# Patient Record
Sex: MALE | Race: WHITE | URBAN - METROPOLITAN AREA
[De-identification: names, ages, dates, MRNs, and addresses within clinical notes are randomized per-mention and may not be internally consistent; named-entity substitution may affect disease eponyms.]

---

## 2018-03-22 ENCOUNTER — EMERGENCY (EMERGENCY)
Facility: HOSPITAL | Age: 28
LOS: 0 days | Discharge: HOME | End: 2018-03-23
Attending: EMERGENCY MEDICINE

## 2018-03-22 VITALS
RESPIRATION RATE: 18 BRPM | OXYGEN SATURATION: 100 % | DIASTOLIC BLOOD PRESSURE: 103 MMHG | HEIGHT: 71 IN | SYSTOLIC BLOOD PRESSURE: 162 MMHG | WEIGHT: 179.9 LBS | HEART RATE: 124 BPM | TEMPERATURE: 99 F

## 2018-03-22 DIAGNOSIS — T40.2X1A POISONING BY OTHER OPIOIDS, ACCIDENTAL (UNINTENTIONAL), INITIAL ENCOUNTER: ICD-10-CM

## 2018-03-22 NOTE — ED PROVIDER NOTE - PHYSICAL EXAMINATION
Vital Signs: I have reviewed the initial vital signs.  Constitutional: well-nourished, appears stated age, no acute distress  Cardiovascular: tachycardia, well-perfused extremities  Respiratory: unlabored respiratory effort, clear to auscultation bilaterally  Gastrointestinal: soft, non-tender abdomen, no pulsatile mass  Musculoskeletal: supple neck, no lower extremity edema  Integumentary: warm, dry, no rash  Neurologic: awake, alert, cranial nerves II-XII grossly intact, extremities’ motor and sensory functions grossly intact  Psychiatric: appropriate mood, appropriate affect

## 2018-03-22 NOTE — ED PROVIDER NOTE - CARE PLAN
Principal Discharge DX:	Opioid overdose, accidental or unintentional, initial encounter  Secondary Diagnosis:	History of opioid abuse

## 2018-03-22 NOTE — ED PROVIDER NOTE - PROGRESS NOTE DETAILS
Pt continues to deny any symptoms.  Doesn't wish for further intervention.  Detectives in ED, speaking with patient.  No evidence of long-acting opioid on board since Naloxone half-time surpassed and patient demonstrates no evidence of opioid toxicity.  Will dc.

## 2018-03-22 NOTE — ED PROVIDER NOTE - OBJECTIVE STATEMENT
Pt is a 26yo male who comes in after being at a "friend's" house and being found with respiratory depression, responding to 2mg IN and 2mg IV Narcan.  Pt denies any complaints now - states he just wants to leave.  Doesn't want anything done.  He is upset because he has dreams of being in [place] and .  He reports he drank and sniffed unknown substance.

## 2018-03-23 VITALS — HEART RATE: 97 BPM
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
## 2021-11-11 ENCOUNTER — EMERGENCY (EMERGENCY)
Facility: HOSPITAL | Age: 31
LOS: 0 days | Discharge: HOME | End: 2021-11-12
Attending: EMERGENCY MEDICINE | Admitting: EMERGENCY MEDICINE
Payer: COMMERCIAL

## 2021-11-11 VITALS
SYSTOLIC BLOOD PRESSURE: 139 MMHG | WEIGHT: 190.04 LBS | OXYGEN SATURATION: 99 % | HEIGHT: 71 IN | HEART RATE: 83 BPM | DIASTOLIC BLOOD PRESSURE: 63 MMHG | TEMPERATURE: 98 F | RESPIRATION RATE: 18 BRPM
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
## 2021-11-11 DIAGNOSIS — M54.59 OTHER LOW BACK PAIN: ICD-10-CM

## 2021-11-11 DIAGNOSIS — M54.9 DORSALGIA, UNSPECIFIED: ICD-10-CM

## 2021-11-11 PROCEDURE — 99284 EMERGENCY DEPT VISIT MOD MDM: CPT

## 2021-11-11 RX ORDER — DEXAMETHASONE 0.5 MG/5ML
10 ELIXIR ORAL ONCE
Refills: 0 | Status: COMPLETED | OUTPATIENT
Start: 2021-11-11 | End: 2021-11-11

## 2021-11-11 RX ORDER — METHOCARBAMOL 500 MG/1
1500 TABLET, FILM COATED ORAL ONCE
Refills: 0 | Status: COMPLETED | OUTPATIENT
Start: 2021-11-11 | End: 2021-11-11

## 2021-11-12 RX ADMIN — Medication 10 MILLIGRAM(S): at 00:28

## 2021-11-12 RX ADMIN — METHOCARBAMOL 1500 MILLIGRAM(S): 500 TABLET, FILM COATED ORAL at 00:28

## 2021-11-12 NOTE — ED PROVIDER NOTE - NSFOLLOWUPINSTRUCTIONS_ED_ALL_ED_FT
Please follow up with primary care if you are still having pain in 3-5 days.    Back Pain    Back pain is very common in adults. The cause of back pain is rarely dangerous and the pain often gets better over time. The cause of your back pain may not be known and may include strain of muscles or ligaments, degeneration of the spinal disks, or arthritis. Occasionally the pain may radiate down your leg(s). Over-the-counter medicines to reduce pain and inflammation are often the most helpful. Stretching and remaining active frequently helps the healing process.     SEEK IMMEDIATE MEDICAL CARE IF YOU HAVE ANY OF THE FOLLOWING SYMPTOMS: bowel or bladder control problems, unusual weakness or numbness in your arms or legs, nausea or vomiting, abdominal pain, fever, dizziness/lightheadedness.

## 2021-11-12 NOTE — ED PROVIDER NOTE - PATIENT PORTAL LINK FT
You can access the FollowMyHealth Patient Portal offered by [facility] by registering at the following website: [email or web address]. By joining [employer]’s FollowMyHealth portal, you will also be able to view your health information using other applications (apps) compatible with our system.

## 2021-11-12 NOTE — ED PROVIDER NOTE - CLINICAL SUMMARY MEDICAL DECISION MAKING FREE TEXT BOX
31yM p/w back injury sustained while lifting weights yesterday.  No focal neuro deficits.  No red flags to necessitate imaging.  Recommend supportive care, o/p f/u if not improving, return precautions.

## 2021-11-12 NOTE — ED PROVIDER NOTE - PHYSICAL EXAMINATION
Physical Exam    Vital Signs: I have reviewed the initial vital signs.  Constitutional: well-nourished, appears stated age, no acute distress  Eyes: Conjunctiva pink, Sclera clear  Cardiovascular: S1 and S2, regular rate, regular rhythm, well-perfused extremities, radial pulses equal and 2+, pedal pulses 2+ and equal   Respiratory: unlabored respiratory effort, clear to auscultation bilaterally no wheezing, rales and rhonchi  Gastrointestinal: soft, non-tender abdomen, no pulsatile mass, normal bowl sounds  Musculoskeletal: supple neck, no lower extremity edema, no midline tenderness, right lumbar paraspinal ttp   Integumentary: warm, dry, no rash  Neurologic: awake, alert, nvi, no saddle anesthesia

## 2021-11-12 NOTE — ED PROVIDER NOTE - ATTENDING CONTRIBUTION TO CARE
31yM p/w back pain - developed burning sacral back pain while working out yesterday.  C/o pain w/ any movement or at rest, but anthony w/ sitting or lying down.  No fever, IVDU, direct trauma to his back.  No numbness, weakness or paresthesias in b/l LE.  No urinary retention/incontinence, fecal incontinence or saddle anesthesia.

## 2021-11-12 NOTE — ED PROVIDER NOTE - OBJECTIVE STATEMENT
30 yo male, no pmh, presents to ed for back pain, right sided, mild, aching, no radiation, occurred after lifting weights in gym. denies numbness, tingling, urinary sxs, fever, chills.

## 2021-11-12 NOTE — ED PROVIDER NOTE - CARE PROVIDER_API CALL
Yun Edmond)  Internal Medicine  [address] [place] [id number]  Phone: [phone]  Fax: [phone]  Follow Up Time: 4-6 Days

## 2022-07-20 PROBLEM — Z00.00 ENCOUNTER FOR PREVENTIVE HEALTH EXAMINATION: Status: ACTIVE | Noted: 2022-07-20

## 2023-03-02 ENCOUNTER — EMERGENCY (EMERGENCY)
Facility: HOSPITAL | Age: 33
LOS: 0 days | Discharge: HOME | End: 2023-03-02
Attending: EMERGENCY MEDICINE
Payer: SELF-PAY

## 2023-03-02 VITALS
WEIGHT: 184.97 LBS | SYSTOLIC BLOOD PRESSURE: 140 MMHG | TEMPERATURE: 99 F | OXYGEN SATURATION: 99 % | RESPIRATION RATE: 18 BRPM | DIASTOLIC BLOOD PRESSURE: 71 MMHG | HEART RATE: 81 BPM

## 2023-03-02 DIAGNOSIS — Z77.21 CONTACT WITH AND (SUSPECTED) EXPOSURE TO POTENTIALLY HAZARDOUS BODY FLUIDS: ICD-10-CM

## 2023-03-02 PROCEDURE — 99282 EMERGENCY DEPT VISIT SF MDM: CPT

## 2023-03-02 PROCEDURE — 99283 EMERGENCY DEPT VISIT LOW MDM: CPT

## 2023-03-02 PROCEDURE — 99053 MED SERV 10PM-8AM 24 HR FAC: CPT
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
## 2023-03-02 NOTE — ED ADULT TRIAGE NOTE - CHIEF COMPLAINT QUOTE
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 Patient was advised to come in for bloodwork.

## 2023-03-02 NOTE — ED PROVIDER NOTE - PATIENT PORTAL LINK FT
You can access the FollowMyHealth Patient Portal offered by [facility] by registering at the following website: [email or web address]. By joining [employer]’s FollowMyHealth portal, you will also be able to view your health information using other applications (apps) compatible with our system.

## 2023-03-02 NOTE — ED PROVIDER NOTE - NSFOLLOWUPINSTRUCTIONS_ED_ALL_ED_FT
Body Fluid Exposure Information    People come in contact with blood and other body fluids in many ways. Sometimes, body fluids may have germs (bacteria or viruses) that can cause infections. These germs can be spread when an infected person's body fluids come in contact with the mouth, nose, eyes, genitals, or broken skin of another person. Broken skin includes chapped skin, cuts, abrasions, or irritation and swelling of the skin (dermatitis).  You are more likely to be exposed to infected body fluids if you:  •Are a health care worker or family member who is taking care of a sick person.  •Use needles to inject drugs, and you share needles with other users.  •Have sex or engage in other sexual activities without using a condom or other protection.    The risk of an infection spreading through exposure to body fluids is small and depends on several factors. These include:  •The type of body fluid.  •How you were exposed to the body fluid.  •The type of infection.  The risk factors of the person who is the source of the body fluid. Your health care provider can help you assess the risk.    What types of body fluids can spread infection?  The following body fluids can spread infections:  •Blood.  •Semen.  •Vaginal secretions.  •Urine.  •Feces.  •Saliva.  •Nasal or eye discharge.  •Breast milk.  •Amniotic fluid.  •Fluids surrounding body organs.  •Pus or other fluids coming from a wound.    What are some first-aid measures for body fluid exposure?    The following steps should be taken as soon as possible after you are exposed to body fluids:    Intact skin   •For contact with closed skin, wash the area with soap and water. If soap and water are not available, use hand .  Broken skin   •For contact with broken skin:  •Let the area bleed a little.  •Wash the area well with soap and water. If soap is not available, use just water or hand .  •Place a bandage or clean towel on the wound and apply gentle pressure to stop the bleeding. Do not squeeze or rub the area.    Do not use harsh chemicals such as bleach or iodine.    Eyes   •Rinse your eyes with water or saline solution for 30 seconds or longer.  •If you are wearing contact lenses, leave the contact lenses in while rinsing your eyes. After the rinsing is complete, remove the contact lenses.    Mouth   •Spit out the fluids. Rinse with water 4–5 times, spitting it out each time.    When should I seek help?  After performing first aid:  •Call your health care provider or seek emergency care right away if blood or other body fluids made contact with broken skin or the eyes, nose, mouth, or genitals.      •Tell your work supervisor immediately if the exposure to body fluid happened in the workplace. Follow your company's procedures for dealing with body fluid exposure.    What will happen after I report the exposure?  Your health care provider will ask you several questions, including questions about:  •Your medical history, including vaccine records.  •Date and time of the exposure.  •Whether you saw body fluids during the exposure.  •Type of body fluid you were exposed to.  •Volume of body fluid you were exposed to.  •How the exposure happened.  •Any devices used, such as needles.  •The area of your body that made contact with the body fluid.  •Any injury to your skin or other areas.  •How long contact was made with the body fluid.  •Whether the person whose body fluid you were exposed to has certain risk factors or health conditions, if known.  Your health care provider will assess your risk for infection. Often, no treatment is necessary. However, in some cases:  •Your health care provider may recommend doing blood tests right away  •Follow-up blood tests may also be done at certain times during the upcoming weeks and months to check for any changes.  •You may be offered treatment to prevent infection after exposure (post-exposure prophylaxis). This may include certain vaccines or medicines. This is necessary when there is a risk of a serious infection, such as HIV (human immunodeficiency virus) or hepatitis B. Your health care provider will discuss the right treatment and vaccines with you.    How can I prevent infection?  To reduce your chances of getting an infection   •Make sure your vaccines are up to date, including vaccines for tetanus and hepatitis.  •Learn and follow any guidelines for preventing exposure (universal precautions) that are provided at your workplace.  •Keep all follow-up visits as told by your health care provider. This is important. You will need to be monitored after you are evaluated for exposure to body fluids.    To avoid spreading infection to others   •Wash your hands frequently with soap and water. If soap and water are not available, use hand .  • Do not share toothbrushes, razors, dental floss, or other personal items.  •Keep open wounds covered.  •Dispose of any items with blood on them by putting them in the trash. This includes razors, tampons, and bandages.  • Do not have sex or engage in sexual activities until you know you are free of infection.  • Do not donate blood, plasma, breast milk, sperm, or other body fluids.  • Do not share drug supplies with others. These include needles, syringes, straws, and pipes.  •Follow all instructions from your health care provider for preventing the spread of infection.      Summary  •Contact with blood and other body fluids can happen in many ways. Sometimes, body fluids may have germs that can cause an infection.  •Treatment depends on the type of body fluid you were exposed to and what part of your body was exposed.  •Call your health care provider or seek emergency care right away if blood or other body fluids made contact with broken skin or the eyes, nose, mouth, or genitals.  •Make sure all your vaccines are up to date, including vaccines for tetanus and hepatitis.    This information is not intended to replace advice given to you by your health care provider. Make sure you discuss any questions you have with your health care provider.

## 2023-03-02 NOTE — ED ADULT NURSE NOTE - CHIEF COMPLAINT QUOTE
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 Patient was advised to come in for bloodwork.

## 2023-03-02 NOTE — ED PROVIDER NOTE - CLINICAL SUMMARY MEDICAL DECISION MAKING FREE TEXT BOX
Patient evaluated for lead exposure, no open injuries or wounds.  No need for any prophylaxis based on lack of exposure to wounds, eyes or needlestick.  Patient advised to follow up with PMD as needed.  Strict return precautions advised and patient verbalized understanding.

## 2023-10-18 ENCOUNTER — NON-APPOINTMENT (OUTPATIENT)
Age: 33
End: 2023-10-18

## 2024-11-18 NOTE — ED ADULT NURSE NOTE - NSFALLRSKASSESASSIST_ED_ALL_ED
Patient has paroxysmal (<7 days) atrial fibrillation. Patient is currently in atrial fibrillation. [id number] Score: 3. The patients heart rate in the last 24 hours is as follows:  Pulse  Min: 67  Max: 78     Antiarrhythmics       Anticoagulants       Plan  - Replete lytes with a goal of K>4, Mg >2  - Patient is anticoagulated, see medications listed above.  - Patient's afib is currently uncontrolled. Will continue current treatment  S/p amiodarone drip and changed to PO   In and out of A fib   On full dose lovenox          no

## 2025-01-23 NOTE — ED PROVIDER NOTE - MDM ORDERS SUBMITTED SELECTION
Orders:    venlafaxine (EFFEXOR-XR) 75 mg 24 hr capsule; Take 1 capsule (75 mg total) by mouth daily with breakfast     Medications

## 2025-07-08 NOTE — ED ADULT NURSE NOTE - NS ED PATIENT SAFETY CONCERN
Do you have any concerns after your procedure? No    Are you having any problems since you left the hospital or do you have any questions that I can answer for you? No    Did we meet your expectations in responding to any concerns or complaints?  Yes    Did you experience any delays? No    Any fever, nausea/vomiting, pain, or bleeding?   None    Follow-Up: None Needed     No

## 2025-08-27 ENCOUNTER — TRANSCRIPTION ENCOUNTER (OUTPATIENT)
Age: 35
End: 2025-08-27